# Patient Record
Sex: FEMALE | Race: WHITE | Employment: FULL TIME | URBAN - METROPOLITAN AREA
[De-identification: names, ages, dates, MRNs, and addresses within clinical notes are randomized per-mention and may not be internally consistent; named-entity substitution may affect disease eponyms.]

---

## 2021-11-28 ENCOUNTER — HOSPITAL ENCOUNTER (EMERGENCY)
Age: 56
Discharge: HOME | End: 2021-11-28
Attending: STUDENT IN AN ORGANIZED HEALTH CARE EDUCATION/TRAINING PROGRAM
Payer: COMMERCIAL

## 2021-11-28 VITALS
TEMPERATURE: 98.1 F | SYSTOLIC BLOOD PRESSURE: 157 MMHG | HEART RATE: 60 BPM | HEIGHT: 66 IN | RESPIRATION RATE: 17 BRPM | DIASTOLIC BLOOD PRESSURE: 90 MMHG | BODY MASS INDEX: 20.89 KG/M2 | WEIGHT: 130 LBS | OXYGEN SATURATION: 100 %

## 2021-11-28 DIAGNOSIS — S16.1XXA STRAIN OF NECK MUSCLE, INITIAL ENCOUNTER: Primary | ICD-10-CM

## 2021-11-28 PROCEDURE — 6360000002 HC RX W HCPCS: Performed by: STUDENT IN AN ORGANIZED HEALTH CARE EDUCATION/TRAINING PROGRAM

## 2021-11-28 PROCEDURE — 99283 EMERGENCY DEPT VISIT LOW MDM: CPT

## 2021-11-28 PROCEDURE — 96372 THER/PROPH/DIAG INJ SC/IM: CPT

## 2021-11-28 PROCEDURE — 6370000000 HC RX 637 (ALT 250 FOR IP): Performed by: STUDENT IN AN ORGANIZED HEALTH CARE EDUCATION/TRAINING PROGRAM

## 2021-11-28 RX ORDER — KETOROLAC TROMETHAMINE 30 MG/ML
15 INJECTION, SOLUTION INTRAMUSCULAR; INTRAVENOUS ONCE
Status: COMPLETED | OUTPATIENT
Start: 2021-11-28 | End: 2021-11-28

## 2021-11-28 RX ORDER — DIAZEPAM 5 MG/1
5 TABLET ORAL ONCE
Status: COMPLETED | OUTPATIENT
Start: 2021-11-28 | End: 2021-11-28

## 2021-11-28 RX ORDER — IBUPROFEN 600 MG/1
600 TABLET ORAL EVERY 6 HOURS PRN
Qty: 30 TABLET | Refills: 0 | Status: SHIPPED | OUTPATIENT
Start: 2021-11-28

## 2021-11-28 RX ORDER — LIDOCAINE 4 G/G
1 PATCH TOPICAL DAILY
Qty: 30 PATCH | Refills: 0 | Status: SHIPPED | OUTPATIENT
Start: 2021-11-28 | End: 2021-12-28

## 2021-11-28 RX ORDER — CYCLOBENZAPRINE HCL 10 MG
10 TABLET ORAL 3 TIMES DAILY PRN
Qty: 21 TABLET | Refills: 0 | Status: SHIPPED | OUTPATIENT
Start: 2021-11-28 | End: 2021-12-08

## 2021-11-28 RX ORDER — LIDOCAINE 4 G/G
1 PATCH TOPICAL DAILY
Status: DISCONTINUED | OUTPATIENT
Start: 2021-11-28 | End: 2021-11-28 | Stop reason: HOSPADM

## 2021-11-28 RX ADMIN — KETOROLAC TROMETHAMINE 15 MG: 30 INJECTION, SOLUTION INTRAMUSCULAR; INTRAVENOUS at 09:42

## 2021-11-28 RX ADMIN — DIAZEPAM 5 MG: 5 TABLET ORAL at 09:42

## 2021-11-28 ASSESSMENT — PAIN SCALES - GENERAL: PAINLEVEL_OUTOF10: 7

## 2021-11-28 ASSESSMENT — PAIN DESCRIPTION - PAIN TYPE: TYPE: ACUTE PAIN

## 2021-11-28 ASSESSMENT — PAIN DESCRIPTION - DESCRIPTORS: DESCRIPTORS: TIGHTNESS

## 2021-11-28 ASSESSMENT — PAIN DESCRIPTION - LOCATION: LOCATION: NECK

## 2021-11-28 NOTE — ED PROVIDER NOTES
[id number] [facility]          ATTENDING PHYSICIAN NOTE       Date of evaluation: 11/28/2021    Chief Complaint     Neck Pain (started Fri evening states pulled a heavy ham and turkey out of a lower oven earlier)      History of Present Illness     Arelis Anaya is a 64 y.o. female who presents with neck pain. Patient presents with neck pain. It is right-sided. It is severe. It worsens with any movement or palpation. She states that began Friday evening after she pulled out a heavy ham and turkey out of the oven while they were celebrating Thanksgiving. She denies any other trauma. Denies any fever or headache. She has tried alternating some over-the-counter pain medications at home with minimal relief. Denies any weakness or numbness of upper extremities. Review of Systems     Positive for: Right-sided neck pain  Negative for: Weakness, numbness, headache, fever    Other systems reviewed and negative. Past Medical, Surgical, Family, and Social History     She has a past medical history of Allergic rhinitis, Hypothyroidism, and Iron (Fe) deficiency anemia. She has a past surgical history that includes eye surgery (1968) and sinus surgery (2006). Her family history includes Arthritis in her mother; Asthma in her maternal grandfather and maternal grandmother; Cancer in her brother, maternal aunt, maternal uncle, paternal grandfather, paternal uncle, and sister; Diabetes in her father; Early Death in her brother, paternal grandmother, and sister; Heart Disease in her maternal aunt, maternal uncle, mother, and paternal uncle; High Blood Pressure in her mother; High Cholesterol in her father, mother, paternal grandfather, paternal uncle, and sister; Mental Illness in her brother; Stroke in her father, mother, and paternal grandfather; Vision Loss in her mother and sister. She reports that she quit smoking about 10 years ago.  She has never used smokeless tobacco. She reports current alcohol use. She reports that she does not use drugs. Medications     Discharge Medication List as of 11/28/2021 10:43 AM      CONTINUE these medications which have NOT CHANGED    Details   loratadine (CLARITIN) 10 MG tablet Take 10 mg by mouth daily. Allergies     She is allergic to amoxicillin-pot clavulanate. Physical Exam     INITIAL VITALS: BP: (!) 157/90, Temp: 98.1 °F (36.7 °C), Pulse: 60, Resp: 17, SpO2: 100 %     General: nontoxic, no acute distress   HEENT: NCAT, EOMI grossly intact, external nose normal, no stridor  Neck: supple, no midline tenderness with palpation. She has superior edge of trapezius and also right neck muscle tenderness with palpation. It is difficult for her to range her head completely due to her muscle spasms. Cardiac: normal rate, regular rhythm, well perfused  Respiratory:  no respiratory distress, no cough  Abdominal: soft, nontender to palpation, no rebound tenderness  Extremities: no pitting edema  Musculoskeletal: no long bone deformities  Skin: no diaphoresis, no rash  Neuro: alert and oriented, moving extremities symmetrically, clear speech. Strong  strength bilaterally. 5/5 strength proximally and distally in upper extremities. Sensation soft touch equal and symmetric and intact in upper extremities  Psych: appropriate mood, normal affect    Diagnostic Results     EKG    N/A    RADIOLOGY:  No orders to display       LABS:   No results found for this visit on 11/28/21. ED BEDSIDE ULTRASOUND:   N/A    RECENT VITALS:  BP: (!) 157/90,Temp: 98.1 °F (36.7 °C), Pulse: 60, Resp: 17, SpO2: 100 %     Procedures      N/A    ED Course     Nursing Notes, Past Medical Hx, Past Surgical Hx, Social Hx,Allergies, and Family Hx were reviewed.     patient was given the following medications:  Orders Placed This Encounter   Medications    diazePAM (VALIUM) tablet 5 mg    ketorolac (TORADOL) injection 15 mg    lidocaine 4 % external patch 1 patch  ibuprofen (ADVIL;MOTRIN) 600 MG tablet     Sig: Take 1 tablet by mouth every 6 hours as needed for Pain     Dispense:  30 tablet     Refill:  0    cyclobenzaprine (FLEXERIL) 10 MG tablet     Sig: Take 1 tablet by mouth 3 times daily as needed for Muscle spasms     Dispense:  21 tablet     Refill:  0    lidocaine 4 % external patch     Sig: Place 1 patch onto the skin daily     Dispense:  30 patch     Refill:  0       CONSULTS:  None    MEDICAL DECISIONMAKING / ASSESSMENT / Yaquelin Caitlyn is a 64 y.o. female with neck pain that came on after lifting heavy food items during Thanksgiving. Denies any other trauma. No headache, fever, signs of infection. Palpable tenderness of the neck muscles on the right side. Given multimodal pain control with Toradol, lidocaine patch, Valium with significant improvement. We will continue multimodal pain control at home. She has no neuro deficits of the bilateral upper extremities. Likely muscle strain. Stable for discharge    Clinical Impression     1. Strain of neck muscle, initial encounter        Disposition     PATIENT REFERRED TO:  No follow-up provider specified.     DISCHARGE MEDICATIONS:  Discharge Medication List as of 11/28/2021 10:43 AM      START taking these medications    Details   cyclobenzaprine (FLEXERIL) 10 MG tablet Take 1 tablet by mouth 3 times daily as needed for Muscle spasms, Disp-21 tablet, R-0Print      lidocaine 4 % external patch Place 1 patch onto the skin daily, TransDERmal, DAILY Starting Sun 11/28/2021, Until Tue 12/28/2021, For 30 days, Disp-30 patch, R-0, Print             DISPOSITION Decision To Discharge 11/28/2021 10:23:47 Kwan Wiley MD  11/28/21 [id number]